# Patient Record
Sex: MALE | Race: WHITE | NOT HISPANIC OR LATINO
[De-identification: names, ages, dates, MRNs, and addresses within clinical notes are randomized per-mention and may not be internally consistent; named-entity substitution may affect disease eponyms.]

---

## 2020-03-02 ENCOUNTER — TRANSCRIPTION ENCOUNTER (OUTPATIENT)
Age: 75
End: 2020-03-02

## 2020-03-10 PROBLEM — E78.5 DYSLIPIDEMIA: Status: ACTIVE | Noted: 2020-03-10

## 2020-03-10 PROBLEM — I10 BENIGN ESSENTIAL HYPERTENSION: Status: ACTIVE | Noted: 2020-03-10

## 2020-03-10 PROBLEM — Z78.9 NO PERTINENT PAST MEDICAL HISTORY: Status: RESOLVED | Noted: 2020-03-10 | Resolved: 2020-03-10

## 2020-03-10 PROBLEM — I25.10 CORONARY DISEASE: Status: ACTIVE | Noted: 2020-03-10

## 2020-03-10 PROBLEM — Z78.9 SOCIAL ALCOHOL USE: Status: ACTIVE | Noted: 2020-03-10

## 2020-03-10 PROBLEM — Z78.9 NON-SMOKER: Status: ACTIVE | Noted: 2020-03-10

## 2020-03-10 PROBLEM — Z82.49 FAMILY HISTORY OF MYOCARDIAL INFARCTION: Status: ACTIVE | Noted: 2020-03-10

## 2020-03-10 RX ORDER — ATORVASTATIN CALCIUM 80 MG/1
TABLET, FILM COATED ORAL
Refills: 0 | Status: ACTIVE | COMMUNITY

## 2020-03-10 RX ORDER — ATENOLOL 50 MG/1
TABLET ORAL
Refills: 0 | Status: ACTIVE | COMMUNITY

## 2020-03-11 ENCOUNTER — APPOINTMENT (OUTPATIENT)
Dept: UROLOGY | Facility: CLINIC | Age: 75
End: 2020-03-11
Payer: MEDICARE

## 2020-03-11 DIAGNOSIS — Z78.9 OTHER SPECIFIED HEALTH STATUS: ICD-10-CM

## 2020-03-11 DIAGNOSIS — Z82.49 FAMILY HISTORY OF ISCHEMIC HEART DISEASE AND OTHER DISEASES OF THE CIRCULATORY SYSTEM: ICD-10-CM

## 2020-03-11 DIAGNOSIS — E78.5 HYPERLIPIDEMIA, UNSPECIFIED: ICD-10-CM

## 2020-03-11 DIAGNOSIS — I25.10 ATHEROSCLEROTIC HEART DISEASE OF NATIVE CORONARY ARTERY W/OUT ANGINA PECTORIS: ICD-10-CM

## 2020-03-11 DIAGNOSIS — I10 ESSENTIAL (PRIMARY) HYPERTENSION: ICD-10-CM

## 2020-03-11 DIAGNOSIS — Z00.00 ENCOUNTER FOR GENERAL ADULT MEDICAL EXAMINATION W/OUT ABNORMAL FINDINGS: ICD-10-CM

## 2020-03-11 LAB
BILIRUB UR QL STRIP: NORMAL
CLARITY UR: CLEAR
COLLECTION METHOD: NORMAL
GLUCOSE UR-MCNC: NORMAL
HCG UR QL: 0.2 EU/DL
HGB UR QL STRIP.AUTO: NORMAL
KETONES UR-MCNC: NORMAL
LEUKOCYTE ESTERASE UR QL STRIP: NORMAL
NITRITE UR QL STRIP: NORMAL
PH UR STRIP: 5
PROT UR STRIP-MCNC: NORMAL
SP GR UR STRIP: 1.02

## 2020-03-11 PROCEDURE — 99215 OFFICE O/P EST HI 40 MIN: CPT

## 2020-03-11 PROCEDURE — 76857 US EXAM PELVIC LIMITED: CPT

## 2020-03-11 PROCEDURE — 81003 URINALYSIS AUTO W/O SCOPE: CPT | Mod: QW

## 2020-03-11 RX ORDER — AMLODIPINE BESYLATE 2.5 MG/1
2.5 TABLET ORAL
Refills: 0 | Status: ACTIVE | COMMUNITY

## 2020-03-11 RX ORDER — ASPIRIN 81 MG
81 TABLET, DELAYED RELEASE (ENTERIC COATED) ORAL
Refills: 0 | Status: ACTIVE | COMMUNITY

## 2020-03-11 RX ORDER — TICAGRELOR 90 MG/1
90 TABLET ORAL
Refills: 0 | Status: DISCONTINUED | COMMUNITY
End: 2020-03-11

## 2020-03-11 NOTE — LETTER BODY
[Dear  ___] : Dear  [unfilled], [Consult Letter:] : I had the pleasure of evaluating your patient, [unfilled]. [Please see my note below.] : Please see my note below. [Consult Closing:] : Thank you very much for allowing me to participate in the care of this patient.  If you have any questions, please do not hesitate to contact me. [Sincerely,] : Sincerely, [FreeTextEntry3] : Amilcar Alan MD, FACS\par

## 2020-03-11 NOTE — ASSESSMENT
[FreeTextEntry1] : I discussed the findings and options with Mr. WALLY CLARK in detail.  He will continue with the alfuzosin and oxybutynin, understanding that the latter medication can exacerbate the urinary retention.  Providing the PSA is stable, we will see him in 6mths (hollis, PSA). In the meantime, he will continue with Viagra 100mg prn.\par

## 2020-03-11 NOTE — PHYSICAL EXAM
[General Appearance - Well Developed] : well developed [General Appearance - Well Nourished] : well nourished [Normal Appearance] : normal appearance [Well Groomed] : well groomed [General Appearance - In No Acute Distress] : no acute distress [Abdomen Soft] : soft [Abdomen Tenderness] : non-tender [Abdomen Mass (___ Cm)] : no abdominal mass palpated [Costovertebral Angle Tenderness] : no ~M costovertebral angle tenderness [Urethral Meatus] : meatus normal [Penis Abnormality] : normal circumcised penis [Urinary Bladder Findings] : the bladder was normal on palpation [Scrotum] : the scrotum was normal [Epididymis] : the epididymides were normal [Testes Tenderness] : no tenderness of the testes [Testes Mass (___cm)] : there were no testicular masses [Prostate Tenderness] : the prostate was not tender [No Prostate Nodules] : no prostate nodules [Heart Rate And Rhythm] : Heart rate and rhythm were normal [Edema] : no peripheral edema [] : no respiratory distress [Respiration, Rhythm And Depth] : normal respiratory rhythm and effort [Exaggerated Use Of Accessory Muscles For Inspiration] : no accessory muscle use [Oriented To Time, Place, And Person] : oriented to person, place, and time [Affect] : the affect was normal [Mood] : the mood was normal [Not Anxious] : not anxious [No Focal Deficits] : no focal deficits [No Palpable Adenopathy] : no palpable adenopathy [Skin Color & Pigmentation] : normal skin color and pigmentation [FreeTextEntry1] : Uses a cane

## 2020-03-11 NOTE — HISTORY OF PRESENT ILLNESS
[FreeTextEntry1] : Mr. WALLY BRAUN comes in today for his urologic follow-up. He has a history of a Group Grade 1 prostatic adenocarcinoma managed with active surveillance (see below).  \par \par From his general urologic history, Mr. Braun reports moderate chronic lower urinary tract symptoms (obstructive and irritative) and nocturia x 2. He's been on alfuzosin and oxybutynin (10 mg).\par IPSS:17/35\par Sono: 107cc PVR; 32cc prostate\par \par An additional problem is erectile dysfunction for which he takes Viagra 100 mg. Mr. Braun reports an adequate response with this PDE 5 inhibitor.\par \par PSAs:  10/31/18--5.9; 7/21/18--6.83; 6/20/18--6.9; 12/5/17--5.7; 6/12/17--5.3; 11/21/16--4.8; 6/117/16--4.5; 1/25/16--5.5; 3/31/15--4.36; 10.8.14--3.8; 11/9/12--4.6\par \par Prostate bxs: 12/18/17--[place] 3+3 (2 cores, 15%); 3/31/15--[place] 3+3 (1 core); 11/27/13--[place] 3+3; 12/13/12--[place] 3+3; \par \par Prostate MRI;  4/20/19--PIRAD 3 lesion (unchanged)

## 2020-03-12 LAB — PSA SERPL-MCNC: 5.09 NG/ML

## 2021-01-12 NOTE — REVIEW OF SYSTEMS
[Eyesight Problems] : eyesight problems [see HPI] : see HPI [Joint Pain] : joint pain [Limb Weakness] : limb weakness [Difficulty Walking] : difficulty walking [Negative] : Heme/Lymph

## 2021-01-15 ENCOUNTER — APPOINTMENT (OUTPATIENT)
Dept: UROLOGY | Facility: CLINIC | Age: 76
End: 2021-01-15
Payer: MEDICARE

## 2021-01-15 VITALS — TEMPERATURE: 98 F

## 2021-01-15 PROCEDURE — 99214 OFFICE O/P EST MOD 30 MIN: CPT | Mod: 25

## 2021-01-15 PROCEDURE — 76857 US EXAM PELVIC LIMITED: CPT

## 2021-01-15 NOTE — HISTORY OF PRESENT ILLNESS
[FreeTextEntry1] : Mr. WALLY CLARK comes in today for his urologic follow-up. He has a history of a Group Grade 1 prostatic adenocarcinoma managed with active surveillance since 2012 (see below).  \par \par From his general urologic history, Mr. Clark reports moderate chronic lower urinary tract symptoms (obstructive and irritative) and nocturia x 2. He's been on alfuzosin and oxybutynin (10 mg).\par IPSS: \par Sono: 45cc PVR; 72cc prostate\par \par An additional problem is erectile dysfunction managed successfully with Viagra 100 mg. \par \par PSAs:  3/11/20--5.09; 10/31/18--5.9; 7/21/18--6.83; 6/20/18--6.9; 12/5/17--5.7; 6/12/17--5.3; 11/21/16--4.8; 6/117/16--4.5; 1/25/16--5.5; 3/31/15--4.36; 10.8.14--3.8; 11/9/12--4.6\par \par Prostate bxs: 12/18/17--[place] 3+3 (2 cores, 15%); 3/31/15--[place] 3+3 (1 core); 11/27/13--[place] 3+3; 12/13/12--[place] 3+3; \par \par Prostate MRI;  4/20/19--PIRAD 3 lesion (unchanged)

## 2021-01-15 NOTE — ASSESSMENT
[FreeTextEntry1] : I discussed the findings and options with Mr. WALLY CLARK in detail.  He will continue with the alfuzosin, oxybutynin and Viagra prn. Refills were provided.\par \par If the PSA remains stable, he will forego any additional testing (e.g. repeat prostate bx, MRI), understanding the pros/cons of this approach.\par \par Providing the there are no new problems, I look forward to seeing Mr. Clark in 6mths (bladder sono, PSA).\par

## 2021-01-15 NOTE — PHYSICAL EXAM
[General Appearance - Well Developed] : well developed [General Appearance - Well Nourished] : well nourished [Normal Appearance] : normal appearance [Well Groomed] : well groomed [General Appearance - In No Acute Distress] : no acute distress [Abdomen Soft] : soft [Abdomen Tenderness] : non-tender [Abdomen Mass (___ Cm)] : no abdominal mass palpated [Costovertebral Angle Tenderness] : no ~M costovertebral angle tenderness [Urethral Meatus] : meatus normal [Penis Abnormality] : normal circumcised penis [Urinary Bladder Findings] : the bladder was normal on palpation [Scrotum] : the scrotum was normal [Epididymis] : the epididymides were normal [Testes Tenderness] : no tenderness of the testes [Testes Mass (___cm)] : there were no testicular masses [Prostate Tenderness] : the prostate was not tender [No Prostate Nodules] : no prostate nodules [Skin Color & Pigmentation] : normal skin color and pigmentation [Heart Rate And Rhythm] : Heart rate and rhythm were normal [Edema] : no peripheral edema [Respiration, Rhythm And Depth] : normal respiratory rhythm and effort [] : no respiratory distress [Exaggerated Use Of Accessory Muscles For Inspiration] : no accessory muscle use [Oriented To Time, Place, And Person] : oriented to person, place, and time [Affect] : the affect was normal [Mood] : the mood was normal [Not Anxious] : not anxious [No Focal Deficits] : no focal deficits [No Palpable Adenopathy] : no palpable adenopathy [FreeTextEntry1] : Ambulates with a cane

## 2021-01-19 LAB — PSA SERPL-MCNC: 5.81 NG/ML

## 2021-08-03 ENCOUNTER — OUTPATIENT (OUTPATIENT)
Dept: OUTPATIENT SERVICES | Facility: HOSPITAL | Age: 76
LOS: 1 days | End: 2021-08-03
Payer: MEDICARE

## 2021-08-03 DIAGNOSIS — I25.9 CHRONIC ISCHEMIC HEART DISEASE, UNSPECIFIED: ICD-10-CM

## 2021-08-03 PROCEDURE — A9505: CPT

## 2021-08-03 PROCEDURE — 93017 CV STRESS TEST TRACING ONLY: CPT

## 2021-08-03 PROCEDURE — 78452 HT MUSCLE IMAGE SPECT MULT: CPT

## 2021-08-03 PROCEDURE — 93016 CV STRESS TEST SUPVJ ONLY: CPT

## 2021-08-03 PROCEDURE — 93018 CV STRESS TEST I&R ONLY: CPT

## 2021-08-03 PROCEDURE — A9500: CPT

## 2021-08-03 PROCEDURE — 78452 HT MUSCLE IMAGE SPECT MULT: CPT | Mod: 26,MH

## 2021-08-31 ENCOUNTER — NON-APPOINTMENT (OUTPATIENT)
Age: 76
End: 2021-08-31

## 2021-09-01 ENCOUNTER — APPOINTMENT (OUTPATIENT)
Dept: UROLOGY | Facility: CLINIC | Age: 76
End: 2021-09-01
Payer: MEDICARE

## 2021-09-01 VITALS
DIASTOLIC BLOOD PRESSURE: 72 MMHG | TEMPERATURE: 98.2 F | BODY MASS INDEX: 26.96 KG/M2 | HEIGHT: 69 IN | OXYGEN SATURATION: 98 % | WEIGHT: 182 LBS | HEART RATE: 77 BPM | SYSTOLIC BLOOD PRESSURE: 129 MMHG

## 2021-09-01 DIAGNOSIS — M25.559 PAIN IN UNSPECIFIED HIP: ICD-10-CM

## 2021-09-01 PROCEDURE — 51798 US URINE CAPACITY MEASURE: CPT

## 2021-09-01 PROCEDURE — 99215 OFFICE O/P EST HI 40 MIN: CPT

## 2021-09-01 RX ORDER — SILDENAFIL 100 MG/1
100 TABLET, FILM COATED ORAL
Qty: 10 | Refills: 3 | Status: ACTIVE | COMMUNITY
Start: 1900-01-01 | End: 1900-01-01

## 2021-09-01 RX ORDER — OXYBUTYNIN CHLORIDE 10 MG/1
10 TABLET, EXTENDED RELEASE ORAL DAILY
Qty: 90 | Refills: 3 | Status: ACTIVE | COMMUNITY
Start: 1900-01-01 | End: 1900-01-01

## 2021-09-01 RX ORDER — ALFUZOSIN HYDROCHLORIDE 10 MG/1
10 TABLET, EXTENDED RELEASE ORAL DAILY
Qty: 90 | Refills: 3 | Status: ACTIVE | COMMUNITY
Start: 1900-01-01 | End: 1900-01-01

## 2021-09-02 ENCOUNTER — NON-APPOINTMENT (OUTPATIENT)
Age: 76
End: 2021-09-02

## 2021-09-02 LAB — PSA SERPL-MCNC: 9.04 NG/ML

## 2021-09-02 NOTE — HISTORY OF PRESENT ILLNESS
[FreeTextEntry1] : Mr. WALLY CLARK comes in today for his urologic follow-up. He has a history of a Group Grade 1 prostatic adenocarcinoma managed with active surveillance since 2012 (see below).  \par \par From his general urologic history, Mr. Clark reports moderate chronic lower urinary tract symptoms (obstructive and irritative) and nocturia x 2. He experiences rare episodes of urge incontinence and wears one pad (mostly because of his mobility issues from the chronic right hip pain). He's been on alfuzosin and oxybutynin (10 mg).\par IPSS: 16/35\par Sono: 97cc PVR\par \par An additional problem is erectile dysfunction managed successfully with Viagra 100 mg. \par \par PSAs:  1/19/21--5.81; 3/11/20--5.09; 10/31/18--5.9; 7/21/18--6.83; 6/20/18--6.9; 12/5/17--5.7; 6/12/17--5.3; 11/21/16--4.8; 6/117/16--4.5; 1/25/16--5.5; 3/31/15--4.36; 10.8.14--3.8; 11/9/12--4.6\par \par Prostate bxs: 12/18/17--[place] 3+3 (2 cores, 15%); 3/31/15--[place] 3+3 (1 core); 11/27/13--[place] 3+3; 12/13/12--[place] 3+3; \par \par Prostate MRI;  4/20/19--PIRAD 3 lesion (unchanged)

## 2021-09-02 NOTE — ADDENDUM
[FreeTextEntry1] : A portion of this note was written by [Trevin Melendez] on 08/31/2021 acting as a scribe for Dr. Alan. \par \par I have personally reviewed the chart and agree that the record accurately reflects my personal performance of the history, physical exam, assessment, and plan.\par

## 2021-09-02 NOTE — ASSESSMENT
[FreeTextEntry1] : I discussed the findings and options with Mr. WALLY CLARK in detail.  He will continue with the alfuzosin, oxybutynin and Viagra prn. Refills were provided.\par \par Mr. Clark will get an mpMRI and I will call him with that result. We will forego any additional prostate biopsies at this time. \par \par Providing the there are no new problems, I look forward to seeing Mr. Clark in 6mths (bladder sono, PSA).\par

## 2021-09-13 ENCOUNTER — NON-APPOINTMENT (OUTPATIENT)
Age: 76
End: 2021-09-13

## 2021-09-22 ENCOUNTER — APPOINTMENT (OUTPATIENT)
Dept: UROLOGY | Facility: CLINIC | Age: 76
End: 2021-09-22

## 2021-09-28 ENCOUNTER — APPOINTMENT (OUTPATIENT)
Dept: UROLOGY | Facility: CLINIC | Age: 76
End: 2021-09-28
Payer: MEDICARE

## 2021-09-28 VITALS
WEIGHT: 182 LBS | SYSTOLIC BLOOD PRESSURE: 137 MMHG | TEMPERATURE: 97.1 F | DIASTOLIC BLOOD PRESSURE: 63 MMHG | BODY MASS INDEX: 26.96 KG/M2 | HEIGHT: 69 IN | HEART RATE: 82 BPM

## 2021-09-28 LAB
BILIRUB UR QL STRIP: NORMAL
CLARITY UR: CLEAR
COLLECTION METHOD: NORMAL
GLUCOSE UR-MCNC: NORMAL
HCG UR QL: 0.2 EU/DL
HGB UR QL STRIP.AUTO: NORMAL
KETONES UR-MCNC: NORMAL
LEUKOCYTE ESTERASE UR QL STRIP: NORMAL
NITRITE UR QL STRIP: NORMAL
PH UR STRIP: 5.5
PROT UR STRIP-MCNC: NORMAL
SP GR UR STRIP: 1.02

## 2021-09-28 PROCEDURE — 99214 OFFICE O/P EST MOD 30 MIN: CPT

## 2021-09-28 PROCEDURE — 81002 URINALYSIS NONAUTO W/O SCOPE: CPT

## 2021-09-28 NOTE — HISTORY OF PRESENT ILLNESS
[FreeTextEntry1] : Mr. WALLY CLARK comes in today for his urologic follow-up. He has a history of a Group Grade 1 prostatic adenocarcinoma managed with active surveillance since 2012 (see below). \par \par From his general urologic history, Mr. Clark reports moderate chronic lower urinary tract symptoms (obstructive and irritative) and nocturia x 2. He experiences rare episodes of urge incontinence and wears one pad (mostly because of his mobility issues from the chronic right hip pain). He's been on alfuzosin and oxybutynin (10 mg).\par IPSS: 16/35\par Sono: 97cc PVR\par \par An additional problem is erectile dysfunction managed successfully with Viagra 100 mg. \par \par PSAs: 9/1/21--9.0; 1/19/21--5.81; 3/11/20--5.09; 10/31/18--5.9; 7/21/18--6.83; 6/20/18--6.9; 12/5/17--5.7; 6/12/17--5.3; 11/21/16--4.8; 6/117/16--4.5; 1/25/16--5.5; 3/31/15--4.36; 10.8.14--3.8; 11/9/12--4.6\par \par Prostate bxs: 12/18/17--[place] 3+3 (2 cores, 15%); 3/31/15--[place] 3+3 (1 core); 11/27/13--[place] 3+3; 12/13/12--[place] 3+3; \par \par Prostate MRI; 4/20/19--PIRAD 3 lesion (unchanged) \par 9/9/21: 81 cc, 1.7 cm PIRADS 5 lesion in the right PZPM base

## 2021-09-28 NOTE — LETTER BODY
[Dear  ___] : Dear  [unfilled], [Courtesy Letter:] : I had the pleasure of seeing your patient, [unfilled], in my office today. [Please see my note below.] : Please see my note below. [Consult Closing:] : Thank you very much for allowing me to participate in the care of this patient.  If you have any questions, please do not hesitate to contact me. [Sincerely,] : Sincerely, [FreeTextEntry3] : Jimbo Wylie MD

## 2021-09-28 NOTE — ASSESSMENT
[FreeTextEntry1] : 76 year old man with history of [place] 3+3 prostate cancer diagnosed initially 2012, on active survillance with rise in PSA to 9, MRI with 1.7 cm PIRADS 5 lesion in the right PZPM base.\par \par Prostate cancer screening: the patient and I spoke at length about prostate cancer screening, its risks and its benefits.  He understands that many men with prostate cancer will die with the disease rather than of it and we also discussed the results large multi-center [place] and  prostate cancer screening trials. He also understands that PSA in and of itself does not diagnose prostate cancer but only assesses risk to a certain degree. The patient understands that to definitively screen for prostate cancer, a biopsy is required and this procedure has risks, including bleeding, infection, ED and urinary retention.  The patient opted to move forward with the biopsy, which is performed via a transperineal approach with MRI/US guided fusion targeting.\par \par The patient is aware to expect hematuria x 2 weeks and up to 4 weeks of hematospermia.  There is a risk of infection albeit much lower than a transrectal approach. In some cases patients can experience erectile dysfunction but this is usually self limiting.  Any fever/chills after the biopsy the patient is to contact the office and go to the ER for an immediate evaluation. He has been given paper instructions outlining these items - which includes medications to avoid prior to surgery.\par \par 1. CBC, BMP, PSA, Covid Test, UA UCx\par 2. PCP Clearance, had stress test recently with PCP, takes baby aspirin for CAD and 4 prior stents\par 3. TP biopsy at [facility]\par 4. follow up 2 weeks after biopsy with his primary urologist or ourselves.\par 5. we will call with the path results once they are resulted.\par

## 2021-09-29 LAB
APTT BLD: 29.7 SEC
BASOPHILS # BLD AUTO: 0.04 K/UL
BASOPHILS NFR BLD AUTO: 0.4 %
EOSINOPHIL # BLD AUTO: 0.23 K/UL
EOSINOPHIL NFR BLD AUTO: 2.5 %
HCT VFR BLD CALC: 44.4 %
HGB BLD-MCNC: 14.4 G/DL
IMM GRANULOCYTES NFR BLD AUTO: 0.1 %
INR PPP: 1.06 RATIO
LYMPHOCYTES # BLD AUTO: 2.3 K/UL
LYMPHOCYTES NFR BLD AUTO: 25.1 %
MAN DIFF?: NORMAL
MCHC RBC-ENTMCNC: 32.4 GM/DL
MCHC RBC-ENTMCNC: 32.7 PG
MCV RBC AUTO: 100.7 FL
MONOCYTES # BLD AUTO: 1.05 K/UL
MONOCYTES NFR BLD AUTO: 11.5 %
NEUTROPHILS # BLD AUTO: 5.54 K/UL
NEUTROPHILS NFR BLD AUTO: 60.4 %
PLATELET # BLD AUTO: 174 K/UL
PT BLD: 12.6 SEC
RBC # BLD: 4.41 M/UL
RBC # FLD: 11.9 %
WBC # FLD AUTO: 9.17 K/UL

## 2021-10-01 LAB — BACTERIA UR CULT: NORMAL

## 2021-10-14 ENCOUNTER — TRANSCRIPTION ENCOUNTER (OUTPATIENT)
Age: 76
End: 2021-10-14

## 2021-10-17 ENCOUNTER — TRANSCRIPTION ENCOUNTER (OUTPATIENT)
Age: 76
End: 2021-10-17

## 2021-10-18 ENCOUNTER — APPOINTMENT (OUTPATIENT)
Dept: UROLOGY | Facility: AMBULATORY SURGERY CENTER | Age: 76
End: 2021-10-18

## 2021-10-18 ENCOUNTER — RESULT REVIEW (OUTPATIENT)
Age: 76
End: 2021-10-18

## 2021-10-18 ENCOUNTER — OUTPATIENT (OUTPATIENT)
Dept: OUTPATIENT SERVICES | Facility: HOSPITAL | Age: 76
LOS: 1 days | Discharge: HOME | End: 2021-10-18
Payer: MEDICARE

## 2021-10-18 PROCEDURE — 88305 TISSUE EXAM BY PATHOLOGIST: CPT | Mod: 26

## 2021-10-18 PROCEDURE — 76872 US TRANSRECTAL: CPT | Mod: 26

## 2021-10-18 PROCEDURE — 55706 BX PRST8 NDL SAT SAMPLING: CPT | Mod: 22

## 2021-10-18 PROCEDURE — 76377 3D RENDER W/INTRP POSTPROCES: CPT | Mod: 26

## 2021-10-18 NOTE — BRIEF OPERATIVE NOTE - NSICDXBRIEFPROCEDURE_GEN_ALL_CORE_FT
PROCEDURES:  Biopsy of prostate using magnetic resonance imaging-ultrasound fusion guidance 18-Oct-2021 13:01:49  Douglas Delgado

## 2021-10-18 NOTE — BRIEF OPERATIVE NOTE - COMMENTS
Patient prepped in lithotomy position w/ aseptic technique, transperineal MRI fusion w/ ultrasound guided prostate biopsy done. Lesion biopsied and standard biopsy done

## 2021-10-18 NOTE — BRIEF OPERATIVE NOTE - NSICDXBRIEFPOSTOP_GEN_ALL_CORE_FT
POST-OP DIAGNOSIS:  Elevated PSA 18-Oct-2021 13:02:22  Douglas Delgado  Prostate cancer 18-Oct-2021 13:02:31  Douglas Delgado

## 2021-10-18 NOTE — BRIEF OPERATIVE NOTE - NSICDXBRIEFPREOP_GEN_ALL_CORE_FT
PRE-OP DIAGNOSIS:  Prostate cancer 18-Oct-2021 13:01:58  Douglas Delgado  Elevated PSA 18-Oct-2021 13:02:08  Douglas Delgado

## 2021-10-22 ENCOUNTER — APPOINTMENT (OUTPATIENT)
Dept: UROLOGY | Facility: CLINIC | Age: 76
End: 2021-10-22

## 2021-10-25 LAB — SURGICAL PATHOLOGY STUDY: SIGNIFICANT CHANGE UP

## 2021-10-25 NOTE — PHYSICAL EXAM
[General Appearance - Well Developed] : well developed [General Appearance - Well Nourished] : well nourished [Normal Appearance] : normal appearance [Well Groomed] : well groomed [General Appearance - In No Acute Distress] : no acute distress [Abdomen Soft] : soft [Abdomen Tenderness] : non-tender [Abdomen Mass (___ Cm)] : no abdominal mass palpated [Costovertebral Angle Tenderness] : no ~M costovertebral angle tenderness [Urethral Meatus] : meatus normal [Penis Abnormality] : normal circumcised penis [Urinary Bladder Findings] : the bladder was normal on palpation [Scrotum] : the scrotum was normal [Epididymis] : the epididymides were normal [Testes Tenderness] : no tenderness of the testes [Testes Mass (___cm)] : there were no testicular masses [Prostate Tenderness] : the prostate was not tender [No Prostate Nodules] : no prostate nodules [Skin Color & Pigmentation] : normal skin color and pigmentation [Heart Rate And Rhythm] : Heart rate and rhythm were normal [Edema] : no peripheral edema [] : no respiratory distress [Respiration, Rhythm And Depth] : normal respiratory rhythm and effort [Exaggerated Use Of Accessory Muscles For Inspiration] : no accessory muscle use [Affect] : the affect was normal [Oriented To Time, Place, And Person] : oriented to person, place, and time [Mood] : the mood was normal [Not Anxious] : not anxious [No Focal Deficits] : no focal deficits [No Palpable Adenopathy] : no palpable adenopathy [FreeTextEntry1] : Ambulates with a cane

## 2021-10-25 NOTE — HISTORY OF PRESENT ILLNESS
[FreeTextEntry1] : Mr. WALLY CLARK comes in today for his urologic follow-up after undergoing a biopsy with Dr. Jimbo Wylie on October 19, 2021. He has a history of a Group Grade 1 prostatic adenocarcinoma managed with active surveillance since 2012 (see below).  \par \par From his general urologic history, Mr. Clark reports moderate chronic lower urinary tract symptoms (obstructive and irritative) and nocturia x 2. He experiences rare episodes of urge incontinence and wears one pad (mostly because of his mobility issues from the chronic right hip pain). He's been on alfuzosin and oxybutynin (10 mg).\par IPSS: \par Sono: \par \par An additional problem is erectile dysfunction managed successfully with Viagra 100 mg. \par \par PSAs:  1/19/21--5.81; 3/11/20--5.09; 10/31/18--5.9; 7/21/18--6.83; 6/20/18--6.9; 12/5/17--5.7; 6/12/17--5.3; 11/21/16--4.8; 6/117/16--4.5; 1/25/16--5.5; 3/31/15--4.36; 10.8.14--3.8; 11/9/12--4.6\par \par Prostate bxs: 12/18/17--[place] 3+3 (2 cores, 15%); 3/31/15--[place] 3+3 (1 core); 11/27/13--[place] 3+3; 12/13/12--[place] 3+3; \par \par Prostate MRI;  4/20/19--PIRAD 3 lesion (unchanged)

## 2021-10-25 NOTE — ADDENDUM
[FreeTextEntry1] : A portion of this note was written by [Trevin Melendez] on 10/21/2021 acting as a scribe for Dr. Alan. \par \par I have personally reviewed the chart and agree that the record accurately reflects my personal performance of the history, physical exam, assessment, and plan.

## 2021-10-26 ENCOUNTER — NON-APPOINTMENT (OUTPATIENT)
Age: 76
End: 2021-10-26

## 2021-10-28 ENCOUNTER — NON-APPOINTMENT (OUTPATIENT)
Age: 76
End: 2021-10-28

## 2021-11-02 ENCOUNTER — APPOINTMENT (OUTPATIENT)
Dept: UROLOGY | Facility: CLINIC | Age: 76
End: 2021-11-02
Payer: MEDICARE

## 2021-11-02 VITALS — HEART RATE: 59 BPM | TEMPERATURE: 97.4 F | DIASTOLIC BLOOD PRESSURE: 67 MMHG | SYSTOLIC BLOOD PRESSURE: 132 MMHG

## 2021-11-02 DIAGNOSIS — R39.9 UNSPECIFIED SYMPTOMS AND SIGNS INVOLVING THE GENITOURINARY SYSTEM: ICD-10-CM

## 2021-11-02 DIAGNOSIS — R35.1 NOCTURIA: ICD-10-CM

## 2021-11-02 DIAGNOSIS — N52.01 ERECTILE DYSFUNCTION DUE TO ARTERIAL INSUFFICIENCY: ICD-10-CM

## 2021-11-02 DIAGNOSIS — N43.3 HYDROCELE, UNSPECIFIED: ICD-10-CM

## 2021-11-02 DIAGNOSIS — K42.9 UMBILICAL HERNIA W/OUT OBSTRUCTION OR GANGRENE: ICD-10-CM

## 2021-11-02 DIAGNOSIS — C61 MALIGNANT NEOPLASM OF PROSTATE: ICD-10-CM

## 2021-11-02 DIAGNOSIS — R33.9 RETENTION OF URINE, UNSPECIFIED: ICD-10-CM

## 2021-11-02 PROCEDURE — 96402 CHEMO HORMON ANTINEOPL SQ/IM: CPT

## 2021-11-02 PROCEDURE — 51702 INSERT TEMP BLADDER CATH: CPT

## 2021-11-02 PROCEDURE — 99215 OFFICE O/P EST HI 40 MIN: CPT | Mod: 25

## 2021-11-02 PROCEDURE — 51798 US URINE CAPACITY MEASURE: CPT

## 2021-11-02 PROCEDURE — 81003 URINALYSIS AUTO W/O SCOPE: CPT | Mod: QW

## 2021-11-02 RX ORDER — LEUPROLIDE ACETATE 22.5 MG
22.5 KIT INTRAMUSCULAR
Qty: 1 | Refills: 0 | Status: COMPLETED | OUTPATIENT
Start: 2021-11-02

## 2021-11-02 RX ADMIN — LEUPROLIDE ACETATE 1 MG: KIT at 00:00

## 2021-11-03 LAB
ANION GAP SERPL CALC-SCNC: 13 MMOL/L
BUN SERPL-MCNC: 22 MG/DL
CALCIUM SERPL-MCNC: 9.6 MG/DL
CHLORIDE SERPL-SCNC: 103 MMOL/L
CO2 SERPL-SCNC: 24 MMOL/L
CREAT SERPL-MCNC: 0.88 MG/DL
GLUCOSE SERPL-MCNC: 94 MG/DL
POTASSIUM SERPL-SCNC: 4.8 MMOL/L
SODIUM SERPL-SCNC: 140 MMOL/L

## 2021-11-03 NOTE — PHYSICAL EXAM
[General Appearance - Well Developed] : well developed [General Appearance - Well Nourished] : well nourished [Normal Appearance] : normal appearance [Well Groomed] : well groomed [General Appearance - In No Acute Distress] : no acute distress [Abdomen Soft] : soft [Abdomen Tenderness] : non-tender [Abdomen Mass (___ Cm)] : no abdominal mass palpated [Costovertebral Angle Tenderness] : no ~M costovertebral angle tenderness [Urethral Meatus] : meatus normal [Penis Abnormality] : normal circumcised penis [Urinary Bladder Findings] : the bladder was normal on palpation [Scrotum] : the scrotum was normal [Epididymis] : the epididymides were normal [Testes Tenderness] : no tenderness of the testes [Testes Mass (___cm)] : there were no testicular masses [Skin Color & Pigmentation] : normal skin color and pigmentation [Heart Rate And Rhythm] : Heart rate and rhythm were normal [Edema] : no peripheral edema [] : no respiratory distress [Respiration, Rhythm And Depth] : normal respiratory rhythm and effort [Exaggerated Use Of Accessory Muscles For Inspiration] : no accessory muscle use [Oriented To Time, Place, And Person] : oriented to person, place, and time [Affect] : the affect was normal [Mood] : the mood was normal [Not Anxious] : not anxious [No Focal Deficits] : no focal deficits [No Palpable Adenopathy] : no palpable adenopathy [FreeTextEntry1] : Ambulates with a cane

## 2021-11-03 NOTE — ASSESSMENT
[FreeTextEntry1] : I discussed the findings and options with Mr. WALLY CLARK in detail. We decided on clean intermittent catheterization (CIC), instead of an indwelling Dawson catheter, after outlining the pros/cons of each approach. I proceeded to teach Mr. Clark clean intermittent catheterization using a 14 Fr straight Currie catheter and provided him with the appropriate instructions. 1.5L of yellow urine were drained.\par \par Going forward, Mr. Clark should stop the oxybutynin immediately, while continuing on the alfuzosin alone. He will continue with CIC 3-4x daily.  \par \par Lupron 3mth depot was administered.  He has decided on radiation therapy (SBRT or EBRT) which he will have with Dr. Lischalk at [facility]. \par \par I have asked Mr. Clark to return in one month.  Prior to that visit, he should record a voiding diary for 2-3 days. \par \par A CMP is pending.

## 2021-11-03 NOTE — HISTORY OF PRESENT ILLNESS
[FreeTextEntry1] : Mr. WALLY CLARK comes in today for his urologic follow-up. He had a history of a Grade Group 1 prostatic adenocarcinoma managed with active surveillance since 2012, which was upgraded to a Grade Group 4 on a repeat biopsy performed on October 18, 2021.  \par \par Since the prostate biopsy, Mr. Clark has had progressively worsening obstructive lower urinary tract symptoms (hesitancy, intermittency). \par \par From his prior urologic history, Mr. Clark reports moderate chronic lower urinary tract symptoms (obstructive and irritative) and nocturia x 2. He experiences rare episodes of urge incontinence and wears one pad (mostly because of his mobility issues from the chronic right hip pain). He has been on alfuzosin and oxybutynin (10 mg).\par IPSS: 26/35\par Sono: 616cc PVR\par \par An additional problem is erectile dysfunction managed successfully with Viagra 100 mg. \par \par PSAs:  9/2/21--9.04; 1/19/21--5.81; 3/11/20--5.09; 10/31/18--5.9; 7/21/18--6.83; 6/20/18--6.9; 12/5/17--5.7; 6/12/17--5.3; 11/21/16--4.8; 6/117/16--4.5; 1/25/16--5.5; 3/31/15--4.36; 10.8.14--3.8; 11/9/12--4.6\par \par Prostate bxs:  10/18/21-- [place] 4+4 in 8 cores, [place] 3+3 and 1 core; 12/18/17--[place] 3+3 (2 cores, 15%); 3/31/15--[place] 3+3 (1 core); 11/27/13--[place] 3+3; 12/13/12--[place] 3+3; \par \par Prostate MRI;  9/9/21--1.3 x 1.7 peripheral zone.  PIRADS 5 nodule; 4/20/19--PIRAD 3 lesion (unchanged)

## 2021-11-03 NOTE — LETTER BODY
[Dear  ___] : Dear  [unfilled], [Consult Letter:] : I had the pleasure of evaluating your patient, [unfilled]. [Please see my note below.] : Please see my note below. [Consult Closing:] : Thank you very much for allowing me to participate in the care of this patient.  If you have any questions, please do not hesitate to contact me. [Sincerely,] : Sincerely, [DrCharlie  ___] : Dr. BARNES [DrCharlie ___] : Dr. BARNES [FreeTextEntry3] : Amilcar Alan MD, FACS\par

## 2021-11-05 ENCOUNTER — APPOINTMENT (OUTPATIENT)
Dept: UROLOGY | Facility: CLINIC | Age: 76
End: 2021-11-05

## 2021-11-05 ENCOUNTER — NON-APPOINTMENT (OUTPATIENT)
Age: 76
End: 2021-11-05

## 2021-11-09 LAB — BACTERIA UR CULT: ABNORMAL

## 2021-11-10 ENCOUNTER — NON-APPOINTMENT (OUTPATIENT)
Age: 76
End: 2021-11-10

## 2021-11-10 RX ORDER — LEVOFLOXACIN 500 MG/1
500 TABLET, FILM COATED ORAL DAILY
Qty: 5 | Refills: 0 | Status: ACTIVE | COMMUNITY
Start: 2021-11-09 | End: 1900-01-01

## 2021-12-01 ENCOUNTER — APPOINTMENT (OUTPATIENT)
Dept: UROLOGY | Facility: CLINIC | Age: 76
End: 2021-12-01

## 2021-12-26 ENCOUNTER — NON-APPOINTMENT (OUTPATIENT)
Age: 76
End: 2021-12-26

## 2022-02-18 ENCOUNTER — TRANSCRIPTION ENCOUNTER (OUTPATIENT)
Age: 77
End: 2022-02-18

## 2022-02-23 ENCOUNTER — TRANSCRIPTION ENCOUNTER (OUTPATIENT)
Age: 77
End: 2022-02-23

## 2025-06-26 ENCOUNTER — OUTPATIENT (OUTPATIENT)
Dept: OUTPATIENT SERVICES | Facility: HOSPITAL | Age: 80
LOS: 1 days | End: 2025-06-26
Payer: MEDICARE

## 2025-06-26 DIAGNOSIS — I25.9 CHRONIC ISCHEMIC HEART DISEASE, UNSPECIFIED: ICD-10-CM

## 2025-06-26 PROCEDURE — 78452 HT MUSCLE IMAGE SPECT MULT: CPT | Mod: 26

## 2025-06-26 PROCEDURE — 93018 CV STRESS TEST I&R ONLY: CPT

## 2025-06-26 PROCEDURE — 93017 CV STRESS TEST TRACING ONLY: CPT

## 2025-06-26 PROCEDURE — 78452 HT MUSCLE IMAGE SPECT MULT: CPT

## 2025-06-26 PROCEDURE — 93016 CV STRESS TEST SUPVJ ONLY: CPT

## 2025-06-26 PROCEDURE — A9500: CPT
